# Patient Record
Sex: MALE | Race: WHITE | URBAN - METROPOLITAN AREA
[De-identification: names, ages, dates, MRNs, and addresses within clinical notes are randomized per-mention and may not be internally consistent; named-entity substitution may affect disease eponyms.]

---

## 2017-11-28 ENCOUNTER — OFFICE VISIT (OUTPATIENT)
Dept: ORTHOPEDIC SURGERY | Age: 52
End: 2017-11-28

## 2017-11-28 VITALS
DIASTOLIC BLOOD PRESSURE: 64 MMHG | HEIGHT: 69 IN | WEIGHT: 248 LBS | HEART RATE: 67 BPM | SYSTOLIC BLOOD PRESSURE: 126 MMHG | BODY MASS INDEX: 36.73 KG/M2

## 2017-11-28 DIAGNOSIS — M65.9 SYNOVITIS OF RIGHT KNEE: Primary | ICD-10-CM

## 2017-11-28 PROCEDURE — G8427 DOCREV CUR MEDS BY ELIG CLIN: HCPCS | Performed by: ORTHOPAEDIC SURGERY

## 2017-11-28 PROCEDURE — G8484 FLU IMMUNIZE NO ADMIN: HCPCS | Performed by: ORTHOPAEDIC SURGERY

## 2017-11-28 PROCEDURE — 73560 X-RAY EXAM OF KNEE 1 OR 2: CPT | Performed by: ORTHOPAEDIC SURGERY

## 2017-11-28 PROCEDURE — 3017F COLORECTAL CA SCREEN DOC REV: CPT | Performed by: ORTHOPAEDIC SURGERY

## 2017-11-28 PROCEDURE — 99203 OFFICE O/P NEW LOW 30 MIN: CPT | Performed by: ORTHOPAEDIC SURGERY

## 2017-11-28 PROCEDURE — 4004F PT TOBACCO SCREEN RCVD TLK: CPT | Performed by: ORTHOPAEDIC SURGERY

## 2017-11-28 PROCEDURE — 20610 DRAIN/INJ JOINT/BURSA W/O US: CPT | Performed by: ORTHOPAEDIC SURGERY

## 2017-11-28 PROCEDURE — G8417 CALC BMI ABV UP PARAM F/U: HCPCS | Performed by: ORTHOPAEDIC SURGERY

## 2017-11-28 RX ORDER — BETAMETHASONE SODIUM PHOSPHATE AND BETAMETHASONE ACETATE 3; 3 MG/ML; MG/ML
12 INJECTION, SUSPENSION INTRA-ARTICULAR; INTRALESIONAL; INTRAMUSCULAR; SOFT TISSUE ONCE
Status: COMPLETED | OUTPATIENT
Start: 2017-11-28 | End: 2017-11-28

## 2017-11-28 RX ADMIN — BETAMETHASONE SODIUM PHOSPHATE AND BETAMETHASONE ACETATE 12 MG: 3; 3 INJECTION, SUSPENSION INTRA-ARTICULAR; INTRALESIONAL; INTRAMUSCULAR; SOFT TISSUE at 17:33

## 2017-12-04 ENCOUNTER — TELEPHONE (OUTPATIENT)
Dept: ORTHOPEDIC SURGERY | Age: 52
End: 2017-12-04

## 2017-12-08 ENCOUNTER — TELEPHONE (OUTPATIENT)
Dept: ORTHOPEDIC SURGERY | Age: 52
End: 2017-12-08

## 2017-12-08 ENCOUNTER — OFFICE VISIT (OUTPATIENT)
Dept: ORTHOPEDIC SURGERY | Age: 52
End: 2017-12-08

## 2017-12-08 VITALS
HEIGHT: 68 IN | SYSTOLIC BLOOD PRESSURE: 127 MMHG | HEART RATE: 81 BPM | WEIGHT: 248 LBS | BODY MASS INDEX: 37.59 KG/M2 | DIASTOLIC BLOOD PRESSURE: 76 MMHG

## 2017-12-08 DIAGNOSIS — S83.281A TEAR OF LATERAL MENISCUS OF RIGHT KNEE, CURRENT, INITIAL ENCOUNTER: Primary | ICD-10-CM

## 2017-12-08 PROCEDURE — 4004F PT TOBACCO SCREEN RCVD TLK: CPT | Performed by: ORTHOPAEDIC SURGERY

## 2017-12-08 PROCEDURE — G8484 FLU IMMUNIZE NO ADMIN: HCPCS | Performed by: ORTHOPAEDIC SURGERY

## 2017-12-08 PROCEDURE — G8427 DOCREV CUR MEDS BY ELIG CLIN: HCPCS | Performed by: ORTHOPAEDIC SURGERY

## 2017-12-08 PROCEDURE — 99212 OFFICE O/P EST SF 10 MIN: CPT | Performed by: ORTHOPAEDIC SURGERY

## 2017-12-08 PROCEDURE — G8417 CALC BMI ABV UP PARAM F/U: HCPCS | Performed by: ORTHOPAEDIC SURGERY

## 2017-12-08 PROCEDURE — 3017F COLORECTAL CA SCREEN DOC REV: CPT | Performed by: ORTHOPAEDIC SURGERY

## 2017-12-08 RX ORDER — MELOXICAM 15 MG/1
15 TABLET ORAL DAILY
Qty: 30 TABLET | Refills: 0 | Status: SHIPPED | OUTPATIENT
Start: 2017-12-08

## 2017-12-08 NOTE — TELEPHONE ENCOUNTER
Pt called on call service at 5:20 Friday night stating that his prescription was not sent to [facility]. I stated to him that I did not see an order for a prescription and told him that Dr. Shruthi Candelario staff could still be working on it. He then hung up and I was not able to further discuss the situation with the patient.

## 2017-12-11 NOTE — PROGRESS NOTES
doses 20 tablet 0     No current facility-administered medications for this visit. allergies, social and family histories were reviewed and updated as appropriate. Review of Systems:  Relevant review of systems reviewed and available in the patient's chart    Vital Signs:  /64   Pulse 67   Ht 5' 9\" (1.753 m)   Wt 248 lb (112.5 kg)   BMI 36.62 kg/m²     General Exam:   Constitutional: He is adequately groomed with no evidence of malnutrition, class II obesity noted  Mental Status: He is oriented to time, place and person. Normal mentation and affect for age. Lymphatic: The lymphatic examination bilaterally reveals all areas to be without enlargement or induration. Vascular: Examination reveals no swelling or calf tenderness. Peripheral pulses are palpable and 2+. Neurological: He has good coordination and balance. There is no focal weakness or sensory deficit. Right Knee Examination:    Inspection:  Right knee shows normal joint alignment with mild effusion. No erythema. No scars or abrasions. Palpation:  There is tenderness along the medial lateral retinacular areas with mild joint line tenderness. Range of Motion:  He can achieve full extension but last 5° or quite uncomfortable, he tolerates flexion to about 105°. Strength:  Quad 4 / 5  ; Hamstrings 4+ / 5. Gross motor to hip and ankle intact    Special Tests:      negative anterior drawer    no posterior drawer    does not open to valgus or varus stress at 0 or 30°    negative Los's    negative Homans    Posterior tibial pulses are +2/4 capillary refill is brisk sensation is intact. Skin: There are no rashes, ulcerations or lesions. Gait: Ambulates with an antalgic gait pattern favoring his right knee. Radiology:     X-rays obtained today and reviewed in office:  Views 2 Right  Knee with comparison AP and flexion PA  left knee.   2 views nonweightbearing of his right knee were also obtained in the ER November 26 and were reviewed. Impression No evidence for acute fracture or subluxation / dislocation. There are mild degenerative changes in the knee without significant loss of joint space. Subchondral sclerosis is noted. No lytic or blastic lesions. A small joint effusion is noted on the lateral view from the ER November 26. Impression:  Encounter Diagnosis   Name Primary?  Synovitis of right knee Yes       Office Procedures:  Orders Placed This Encounter   Procedures    XR KNEE RIGHT (1-2 VIEWS)     RM 7. Bilat AP & PA standing. 2V Rt Knee     Order Specific Question:   Reason for exam:     Answer:   Knee Pain    NV ARTHROCENTESIS ASPIR&/INJ MAJOR JT/BURSA W/O US     Right knee       Treatment Plan:   We discussed treatment options. At this point he seems to developed synovitis of the knee likely secondary to some of his activity at work. I reviewed with him the natural history of this as well as the potential treatment options. These had minimal success with oral nonsteroidal anti-inflammatories. Discussed the option of cortisone injection which may provide more improvement in his pain relief and could result in complete resolution of his symptoms. We discussed the risks and benefits of the injection and he agreed to receive this today. Right knee is prepped with Betadine and 2 mL of betamethasone mixed with 5 mL 1% lidocaine plain were carefully aspirated and injected through an superior lateral broach under aseptic technique. He tolerated this well. I'll have him give us a call back over the next 3-5 days to discuss his response to the cortisone injection and we can plan for further treatment accordingly.     Cecy Valverde understands and accepts this course of care

## 2017-12-16 ENCOUNTER — HOSPITAL ENCOUNTER (OUTPATIENT)
Dept: MRI IMAGING | Age: 52
Discharge: HOME | End: 2017-12-16
Attending: ORTHOPAEDIC SURGERY | Admitting: ORTHOPAEDIC SURGERY

## 2017-12-16 DIAGNOSIS — S83.281A TEAR OF LATERAL MENISCUS OF RIGHT KNEE, CURRENT, INITIAL ENCOUNTER: ICD-10-CM

## 2017-12-17 ENCOUNTER — TELEPHONE (OUTPATIENT)
Dept: ORTHOPEDIC SURGERY | Age: 52
End: 2017-12-17

## 2017-12-17 DIAGNOSIS — M71.21 BAKER'S CYST OF KNEE, RIGHT: ICD-10-CM

## 2017-12-17 DIAGNOSIS — S83.241A TEAR OF MEDIAL MENISCUS OF RIGHT KNEE, CURRENT, INITIAL ENCOUNTER: Primary | ICD-10-CM

## 2017-12-18 NOTE — TELEPHONE ENCOUNTER
Patient calling to get his MRI results. Gave message below. Patient understands and is in agreement with this plan. He would like to go to [facility] therapy    Patient will try therapy and call back if any new symptoms or increased pain. If not he will call back in 3-4 weeks to give update on progress.     Please call if needed  [phone]

## 2017-12-18 NOTE — PROGRESS NOTES
Richard Burks  [id number]  Encounter Date: 12/8/2017  YOB: 1965    Chief Complaint   Patient presents with    Knee Pain     fu for RT knee pain, synovitis. little better but still hurting        History:Mr. Richard Burks is here in follow up regarding his right knee. He feels that the pain is a little bit better but still averaging about a 2/10. Pain continues to be mostly over the lateral aspect of the knee. Swelling seems to be better since his last visit but has not fully resolved. He denies fevers or chills. No new injury since his last visit. Exam:  /76   Pulse 81   Ht 5' 8\" (1.727 m)   Wt 248 lb (112.5 kg)   BMI 37.71 kg/m²   General: Alert and oriented x3, No acute distress, Cooperative and conversant, class II obesity noted  right knee: There is still trace effusion with no warmth or erythema. He has tenderness along the lateral joint line area. Trace of tenderness along the medial joint line. He can tolerate full extension although the last few degrees are uncomfortable. Pain limits his flexion beyond 115°. Calf is soft with negative Homans sign. Gross motor function and light touch sensation intact throughout his right lower extremity with quad strength 4+/5 limited by pain. He does have a positive Los's laterally today. Gait: He continues to ambulate with altered stride length and decreased single limb support time on the right. No signs / symptoms of DVT / PE or infection    Assessment:  1. Tear of lateral meniscus of right knee, current, initial encounter  MRI Knee Right W JT WO Contrast       Orders  Orders Placed This Encounter   Procedures    MRI Knee Right W JT WO Contrast       Plan:  His symptoms are concerning for possible development of an acute lateral meniscal tear which has failed to respond to cortisone injection and nonsteroidal anti-inflammatories.   We will go ahead and trial a different nonsteroidal anti-inflammatory and check an MRI

## 2018-01-02 ENCOUNTER — HOSPITAL ENCOUNTER (OUTPATIENT)
Dept: PHYSICAL THERAPY | Age: 53
Discharge: HOME | End: 2018-01-31
Attending: ORTHOPAEDIC SURGERY | Admitting: ORTHOPAEDIC SURGERY

## 2018-01-02 NOTE — PLAN OF CARE
Outpatient Physical Therapy     Phone: [phone] Fax: [phone]     To: Referring Practitioner: Dino Lundberg MD       Patient: Macel Duverney   : 1965   MRN: [id number]  Evaluation Date: 2018      Diagnosis Information:  · Diagnosis: Tear of medial meniscus R knee, baker's cyst    · Treatment Diagnosis: Decreased hip abd and ext strength, decreased R knee flexion strength and apparent leg length discrepancy (R shorter than L). MRI + for R medial meniscus tear on posterior horn and baker's cyst     Physical Therapy Certification/Re-Certification Form  Dear Dr. David Johnson,   The following patient has been evaluated for physical therapy services. Please review the attached evaluation and/or summary of the patient's plan of care, and verify that you agree therapy should continue by signing the attached document and sending it back to our office. Plan of Care/Treatment to date:  [x] Therapeutic Exercise      [x] Modalities:  [x] Therapeutic Activity        [x] Ultrasound    [x] Gait Training        [] Cervical Traction   [x] Neuromuscular Re-education      [x] Cold/hotpack    [x] Instruction in HEP        [] Lumbar Traction  [x] Manual Therapy        [x] Electrical Stimulation            [] Aquatic Therapy        [] Iontophoresis        ? [] Lymphedema management  [] Women's Health     Other:  [] Vestibular Rehab        []    []  Needed     Frequency/Duration:  # Days per week: [] 1 day # Weeks: [] 1 week [] 5 weeks     [x] 2 days? [] 2 weeks [x] 6 weeks     [] 3 days   [] 3 weeks [] 7 weeks     [] 4 days   [] 4 weeks [] 8 weeks    Rehab Potential: [] Excellent [x] Good [x] Fair  [] Poor     Electronically signed by:  John Wade PT    If you have any questions or concerns, please don't hesitate to call.   Thank you for your referral.      Physician Signature:________________________________Date:__________________  By signing above, therapists plan is approved by physician

## 2018-02-01 ENCOUNTER — HOSPITAL ENCOUNTER (OUTPATIENT)
Dept: OTHER | Age: 53
Discharge: HOME | End: 2018-02-28
Attending: ORTHOPAEDIC SURGERY | Admitting: ORTHOPAEDIC SURGERY

## 2018-03-01 ENCOUNTER — HOSPITAL ENCOUNTER (OUTPATIENT)
Dept: OTHER | Age: 53
Discharge: HOME | End: 2018-03-31
Attending: ORTHOPAEDIC SURGERY | Admitting: ORTHOPAEDIC SURGERY

## 2018-10-06 ENCOUNTER — HOSPITAL ENCOUNTER (EMERGENCY)
Age: 53
Discharge: HOME | End: 2018-10-06
Attending: EMERGENCY MEDICINE
Payer: COMMERCIAL

## 2018-10-06 ENCOUNTER — APPOINTMENT (OUTPATIENT)
Dept: GENERAL RADIOLOGY | Age: 53
End: 2018-10-06

## 2018-10-06 VITALS
OXYGEN SATURATION: 96 % | TEMPERATURE: 98.8 F | HEART RATE: 76 BPM | BODY MASS INDEX: 37.03 KG/M2 | DIASTOLIC BLOOD PRESSURE: 90 MMHG | HEIGHT: 69 IN | SYSTOLIC BLOOD PRESSURE: 139 MMHG | WEIGHT: 250 LBS | RESPIRATION RATE: 15 BRPM

## 2018-10-06 DIAGNOSIS — R07.89 CHEST TIGHTNESS: Primary | ICD-10-CM

## 2018-10-06 LAB
A/G RATIO: 1.3 (ref 1.1–2.2)
ALBUMIN SERPL-MCNC: 4.3 G/DL (ref 3.4–5)
ALP BLD-CCNC: 80 U/L (ref 40–129)
ALT SERPL-CCNC: 9 U/L (ref 10–40)
ANION GAP SERPL CALCULATED.3IONS-SCNC: 13 MMOL/L (ref 3–16)
APTT: 34 SEC (ref 26–36)
AST SERPL-CCNC: 19 U/L (ref 15–37)
BASOPHILS ABSOLUTE: 0.1 K/UL (ref 0–0.2)
BASOPHILS RELATIVE PERCENT: 0.9 %
BILIRUB SERPL-MCNC: 0.4 MG/DL (ref 0–1)
BUN BLDV-MCNC: 14 MG/DL (ref 7–20)
CALCIUM SERPL-MCNC: 9.5 MG/DL (ref 8.3–10.6)
CHLORIDE BLD-SCNC: 101 MMOL/L (ref 99–110)
CO2: 25 MMOL/L (ref 21–32)
CREAT SERPL-MCNC: 0.8 MG/DL (ref 0.9–1.3)
EOSINOPHILS ABSOLUTE: 0.2 K/UL (ref 0–0.6)
EOSINOPHILS RELATIVE PERCENT: 1.6 %
GFR AFRICAN AMERICAN: >60
GFR NON-AFRICAN AMERICAN: >60
GLOBULIN: 3.3 G/DL
GLUCOSE BLD-MCNC: 118 MG/DL (ref 70–99)
HCT VFR BLD CALC: 48.9 % (ref 40.5–52.5)
HEMOGLOBIN: 16.6 G/DL (ref 13.5–17.5)
INR BLD: 0.95 (ref 0.86–1.14)
LYMPHOCYTES ABSOLUTE: 2.7 K/UL (ref 1–5.1)
LYMPHOCYTES RELATIVE PERCENT: 23 %
MCH RBC QN AUTO: 29.4 PG (ref 26–34)
MCHC RBC AUTO-ENTMCNC: 33.8 G/DL (ref 31–36)
MCV RBC AUTO: 87 FL (ref 80–100)
MONOCYTES ABSOLUTE: 0.9 K/UL (ref 0–1.3)
MONOCYTES RELATIVE PERCENT: 7.6 %
NEUTROPHILS ABSOLUTE: 7.8 K/UL (ref 1.7–7.7)
NEUTROPHILS RELATIVE PERCENT: 66.9 %
PDW BLD-RTO: 13.5 % (ref 12.4–15.4)
PLATELET # BLD: 236 K/UL (ref 135–450)
PMV BLD AUTO: 9 FL (ref 5–10.5)
POTASSIUM SERPL-SCNC: 4.4 MMOL/L (ref 3.5–5.1)
PRO-BNP: 39 PG/ML (ref 0–124)
PROTHROMBIN TIME: 10.8 SEC (ref 9.8–13)
RBC # BLD: 5.62 M/UL (ref 4.2–5.9)
SODIUM BLD-SCNC: 139 MMOL/L (ref 136–145)
TOTAL PROTEIN: 7.6 G/DL (ref 6.4–8.2)
TROPONIN: <0.01 NG/ML
WBC # BLD: 11.7 K/UL (ref 4–11)

## 2018-10-06 PROCEDURE — 80053 COMPREHEN METABOLIC PANEL: CPT

## 2018-10-06 PROCEDURE — 85610 PROTHROMBIN TIME: CPT

## 2018-10-06 PROCEDURE — 85730 THROMBOPLASTIN TIME PARTIAL: CPT

## 2018-10-06 PROCEDURE — 93005 ELECTROCARDIOGRAM TRACING: CPT | Performed by: EMERGENCY MEDICINE

## 2018-10-06 PROCEDURE — 84484 ASSAY OF TROPONIN QUANT: CPT

## 2018-10-06 PROCEDURE — 36415 COLL VENOUS BLD VENIPUNCTURE: CPT

## 2018-10-06 PROCEDURE — 85025 COMPLETE CBC W/AUTO DIFF WBC: CPT

## 2018-10-06 PROCEDURE — 99284 EMERGENCY DEPT VISIT MOD MDM: CPT

## 2018-10-06 PROCEDURE — 83880 ASSAY OF NATRIURETIC PEPTIDE: CPT

## 2018-10-06 PROCEDURE — 71046 X-RAY EXAM CHEST 2 VIEWS: CPT

## 2018-10-06 ASSESSMENT — ENCOUNTER SYMPTOMS
RHINORRHEA: 0
VOMITING: 0
NAUSEA: 0
DIARRHEA: 0
COUGH: 1
SHORTNESS OF BREATH: 0
ABDOMINAL PAIN: 0
WHEEZING: 0

## 2018-10-06 ASSESSMENT — PAIN SCALES - GENERAL: PAINLEVEL_OUTOF10: 5

## 2018-10-06 ASSESSMENT — HEART SCORE: ECG: 0
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
## 2018-10-07 PROCEDURE — 93010 ELECTROCARDIOGRAM REPORT: CPT | Performed by: INTERNAL MEDICINE

## 2018-10-09 LAB
EKG ATRIAL RATE: 71 BPM
EKG DIAGNOSIS: NORMAL
EKG P AXIS: 16 DEGREES
EKG P-R INTERVAL: 196 MS
EKG Q-T INTERVAL: 366 MS
EKG QRS DURATION: 114 MS
EKG QTC CALCULATION (BAZETT): 397 MS
EKG R AXIS: 18 DEGREES
EKG T AXIS: 49 DEGREES
EKG VENTRICULAR RATE: 71 BPM

## 2019-05-16 NOTE — FLOWSHEET NOTE
Physical Therapy Daily Treatment Note  Date:  2018    Patient Name:  Peri De La Garza    :  1965  MRN: [id number]  Restrictions/Precautions:    Medical/Treatment Diagnosis Information:   Diagnosis: Tear of medial meniscus R knee, baker's cyst   · Treatment Diagnosis: Decreased hip abd and ext strength, decreased R knee flexion strength and apparent leg length discrepancy (R shorter than L). MRI + for R medial meniscus tear on posterior horn and baker's cyst    Tracking Information:  Physician Information Referring Practitioner: Yousif Yeager MD      Plan of Care Sent Date: 18 Signed Received:    Visit Count / Total Visits      Insurance Approved Visits    Approved Dates:     Insurance Information PT Insurance Information: [facility] 30 visits per year      Progress Note/G-codes   []  Yes  []  No Next Due:      Pain level: 2-8/10     Subjective:  See eval     Objective:   Observation: See eval   Test measurements:      Exercises:  Exercise/Equipment Resistance/Repetitions Other comments   Bike                Mat therex  Bridging x10 Onset of R medial knee swelling after bridging. Other Therapeutic Activities: Patient education on PT and plan of care including diagnosis, prognosis, treatment goals and options. :Pt ed on use of ice for edema control. Home Exercise Program:  The following exercises were performed and added to the pt's home program (educated on appropriate frequency, intensity and duration etc.): S/L hip abd, HS, hip flexor/quad stretching, LAQ. Manual Treatments:      Modalities:   Ice to R knee with knee in elevation and ext on grey bolster x12 min     Timed Code Treatment Minutes:  25    Total Treatment Minutes:  55    Treatment/Activity Tolerance:  [] Patient tolerated treatment well [] Patient limited by fatigue  [x] Patient limited by pain  [] Patient limited by other medical complications  [] Other: [Negative] : Gastrointestinal